# Patient Record
Sex: MALE | Race: WHITE | URBAN - METROPOLITAN AREA
[De-identification: names, ages, dates, MRNs, and addresses within clinical notes are randomized per-mention and may not be internally consistent; named-entity substitution may affect disease eponyms.]

---

## 2020-03-17 ENCOUNTER — VIRTUAL VISIT (OUTPATIENT)
Dept: FAMILY MEDICINE | Facility: OTHER | Age: 70
End: 2020-03-17

## 2020-03-17 NOTE — PROGRESS NOTES
"Date: 2020 11:43:56  Clinician: Carola Olivo  Clinician NPI: [id number]  Patient: Darryl Paez  Patient : 1950  Patient Address: [address], [place] [address]  Patient Phone: [phone]  Visit Protocol: URI  Patient Summary:   is a 69 year old ( : 1950 ) male who initiated a Visit for COVID-19 (Coronavirus) evaluation and screening. When asked the question \"Please sign me up to receive news, health information and promotions from [employer].\",  responded \"No\".     states his symptoms started suddenly 3-6 days ago.   His symptoms consist of nasal congestion, chills, malaise, a headache, rhinitis, and myalgia.   Symptom details     Nasal secretions: The color of his mucus is clear.    Headache: He states the headache is moderate (4-6 on a 10 point pain scale).       denies having wheezing, sore throat, cough, teeth pain, fever, ear pain, enlarged lymph nodes, and facial pain or pressure. He also denies taking antibiotic medication for the symptoms, having recent facial or sinus surgery in the past 60 days, and double sickening (worsening symptoms after initial improvement). He is not experiencing dyspnea.   Precipitating events  He has recently been exposed to someone with influenza.  has been in close contact with the following high risk individuals: adults 65 or older.   Pertinent COVID-19 (Coronavirus) information   has not traveled internationally or to the areas where COVID-19 (Coronavirus) is widespread in the last 14 days before the start of his symptoms.    has not had a close contact with a laboratory-confirmed COVID-19 patient within 14 days of symptom onset. He also has not had a close contact with a suspected COVID-19 patient within 14 days of symptom onset.    is not a healthcare worker and does not work in a healthcare facility.   Pertinent medical history   does not need a return to work/school note.   " Weight: 215 lbs    does not smoke or use smokeless tobacco.   Weight: 215 lbs    MEDICATIONS: spironolactone oral, losartan oral, atorvastatin oral, esomeprazole magnesium oral, ALLERGIES: NKDA  Clinician Response:  Dear ,  Based on the information provided, you have viral sinusitis, also known as a sinus infection. Sinus infections are caused by bacteria or a virus and symptoms are almost always identical. The difference between the 2 types of infections is timing.  Sinus infections start as viral infections and symptoms improve on their own in about 7 days. If symptoms have not improved after 7 days or have even worsened, a bacterial infection may have developed.  Medication information  Because you have a viral infection, antibiotics will not help you get better. Treating a viral infection with antibiotics could actually make you feel worse.  Self care  The following tips will keep you as comfortable as possible while you recover:     Rest    Drink plenty of water and other liquids    Take a hot shower to loosen congestion     When to seek care  Please be seen in a clinic or urgent care if new symptoms develop, or symptoms become worse.  Additional treatment plan   Based on the information you have provided, you do have symptoms that are consistent with Coronavirus (COVID-19).  The coronavirus causes mild to severe respiratory illness with the most common symptoms including fever, cough and difficulty breathing. Unfortunately, many viruses cause similar symptoms and it can be difficult to distinguish between viruses, especially in mild cases, so we are presuming that anyone with cough or fever has coronavirus at this time.  Coronavirus/COVID-19 has reached the point of community spread in [place], meaning that we are finding the virus in people with no known exposure risk for escobar the virus. Given the increasing commonness of coronavirus in the community we are no longer testing patients who  are not critically ill.  For everyone else who has cough or fever, you should assume you are infected with coronavirus. Accordingly, you should self-quarantine for fourteen days from the first day your symptoms started. You should call if you find increasing shortness of breath, wheezing or sustained fever above 101.5. If you are significantly short of breath or experience chest pain you should call 911 or report to the nearest emergency department for urgent evaluation.    Isolate yourself at home.   Do Not allow any visitors  Do Not go to work or school  Do Not go to [employer],  centers, shopping, or other public places.  Do Not shake hands.  Avoid close contact with others (hugging, kissing).   Protect Others:    Cover Your Mouth and Nose with a mask, disposable tissue or wash cloth to avoid spreading germs to others.  Wash your hands and face frequently with soap and water.   If you develop significant shortness of breath that prevents you from doing normal activities, please call 911 or proceed to the nearest emergency room and alert them immediately that you have been in self-isolation for possible coronavirus.   For more information about COVID19 and options for caring for yourself at home, please visit the CDC website at https://www.cdc.gov/coronavirus/2019-ncov/about/steps-when-sick.htmlFor more options for care at [facility], please visit our website at [email or web address]     Diagnosis: Other acute sinusitis  Diagnosis ICD: J01.80